# Patient Record
Sex: FEMALE | Race: WHITE | NOT HISPANIC OR LATINO | URBAN - METROPOLITAN AREA
[De-identification: names, ages, dates, MRNs, and addresses within clinical notes are randomized per-mention and may not be internally consistent; named-entity substitution may affect disease eponyms.]

---

## 2025-07-08 ENCOUNTER — OFFICE VISIT (OUTPATIENT)
Dept: URGENT CARE | Age: 40
End: 2025-07-08
Payer: COMMERCIAL

## 2025-07-08 VITALS
TEMPERATURE: 98.3 F | DIASTOLIC BLOOD PRESSURE: 80 MMHG | RESPIRATION RATE: 20 BRPM | HEIGHT: 68 IN | WEIGHT: 135 LBS | SYSTOLIC BLOOD PRESSURE: 129 MMHG | BODY MASS INDEX: 20.46 KG/M2 | OXYGEN SATURATION: 97 % | HEART RATE: 73 BPM

## 2025-07-08 DIAGNOSIS — N60.41 PERIDUCTAL MASTITIS OF RIGHT BREAST: Primary | ICD-10-CM

## 2025-07-08 RX ORDER — CEPHALEXIN 500 MG/1
500 CAPSULE ORAL 4 TIMES DAILY
Qty: 40 CAPSULE | Refills: 0 | Status: SHIPPED | OUTPATIENT
Start: 2025-07-08 | End: 2025-07-18

## 2025-07-08 ASSESSMENT — PAIN SCALES - GENERAL: PAINLEVEL_OUTOF10: 5

## 2025-07-08 NOTE — PROGRESS NOTES
"Subjective   Patient ID: Pia Solares is a 39 y.o. female. They present today with a chief complaint of Breast Pain (X 1 day (rt) w discharge).    History of Present Illness  39-year-old female presents with a 3-day history of right nipple pain, redness, swelling, and a glossy appearance. She reports that the symptoms have been progressively worsening, and the pain now radiates into the right underarm. She is not currently breastfeeding and denies any known trauma or injury to the area. She has attempted leaving her bra off and applying topical ointments at home without relief. Denies fever, chills, discharge, breast lumps, recent illness, or systemic symptoms.        Past Medical History  Allergies as of 07/08/2025    (No Known Allergies)       Prescriptions Prior to Admission[1]     Medical History[2]    Surgical History[3]     reports that she has never smoked. She has never used smokeless tobacco.    Review of Systems  Review of Systems  Breast: Positive for right nipple pain, redness, swelling, and pain radiating into axilla.    Constitutional: Denies fever, chills, fatigue, or malaise.    Skin: Denies rash or trauma.    : Denies recent pregnancy, lactation, or breast discharge.    GI/Neuro: Denies nausea, vomiting, headache, or dizziness.       Objective    Vitals:    07/08/25 1435   BP: 129/80   Pulse: 73   Resp: 20   Temp: 36.8 °C (98.3 °F)   TempSrc: Oral   SpO2: 97%   Weight: 61.2 kg (135 lb)   Height: 1.727 m (5' 8\")     Patient's last menstrual period was 07/06/2025 (approximate).    Physical Exam  General: Well-appearing, alert, no acute distress.    Breasts:  Right nipple visibly swollen, erythematous, tender to palpation with glossy appearance.  Surrounding areolar and subareolar tissue mildly erythematous without fluctuance or induration.  No nipple discharge or skin breakdown noted.  No palpable mass or lymphadenopathy.  Axillae: No palpable lymph nodes bilaterally.    Skin: No other skin changes, " no rash.    Lungs/Heart: Clear breath sounds; regular rate and rhythm.  Procedures    Point of Care Test & Imaging Results from this visit  No results found for this visit on 07/08/25.   Imaging  No results found.    Cardiology, Vascular, and Other Imaging  No other imaging results found for the past 2 days      Diagnostic study results (if any) were reviewed by EFRAÍN Hare.    Assessment/Plan   Allergies, medications, history, and pertinent labs/EKGs/Imaging reviewed by EFRAÍN Hare.     Medical Decision Making  39-year-old non-lactating female presenting with symptoms consistent with periductal mastitis: right nipple pain, erythema, swelling, and axillary radiation. No signs of systemic infection or abscess. No history of trauma or current lactation. Physical exam reveals localized inflammation without fluctuance or masses. No nipple discharge. Empiric treatment initiated with Cephalexin for presumed bacterial mastitis. Warm compresses and analgesics recommended. Patient counseled on signs of progression including fever, increased swelling, or fluctuance suggestive of abscess. Advised to follow up or return if symptoms worsen or fail to improve within 48-72 hours. Referral to breast surgery may be considered if symptoms persist or recur. Patient verbalized understanding and agreeable with plan of care.       Orders and Diagnoses  Diagnoses and all orders for this visit:  Periductal mastitis of right breast  -     cephalexin (Keflex) 500 mg capsule; Take 1 capsule (500 mg) by mouth 4 times a day for 10 days.      Medical Admin Record      Patient disposition: Home    Electronically signed by EFRAÍN Hare  2:59 PM           [1] (Not in a hospital admission)   [2] History reviewed. No pertinent past medical history.  [3]   Past Surgical History:  Procedure Laterality Date    OTHER SURGICAL HISTORY  03/21/2019    [place] tooth extraction

## 2025-10-02 ENCOUNTER — APPOINTMENT (OUTPATIENT)
Dept: PRIMARY CARE | Facility: CLINIC | Age: 40
End: 2025-10-02
Payer: COMMERCIAL